# Patient Record
Sex: MALE
[De-identification: names, ages, dates, MRNs, and addresses within clinical notes are randomized per-mention and may not be internally consistent; named-entity substitution may affect disease eponyms.]

---

## 2020-12-14 ENCOUNTER — APPOINTMENT (OUTPATIENT)
Dept: HEART AND VASCULAR | Facility: CLINIC | Age: 55
End: 2020-12-14
Payer: COMMERCIAL

## 2020-12-14 VITALS — DIASTOLIC BLOOD PRESSURE: 80 MMHG | HEART RATE: 101 BPM | SYSTOLIC BLOOD PRESSURE: 120 MMHG

## 2020-12-14 PROCEDURE — 99204 OFFICE O/P NEW MOD 45 MIN: CPT

## 2020-12-14 PROCEDURE — 99072 ADDL SUPL MATRL&STAF TM PHE: CPT

## 2020-12-14 NOTE — PHYSICAL EXAM
[General Appearance - Well Developed] : well developed [Normal Appearance] : normal appearance [Well Groomed] : well groomed [General Appearance - Well Nourished] : well nourished [No Deformities] : no deformities [General Appearance - In No Acute Distress] : no acute distress [Normal Oral Mucosa] : normal oral mucosa [No Oral Pallor] : no oral pallor [No Oral Cyanosis] : no oral cyanosis [Normal Jugular Venous A Waves Present] : normal jugular venous A waves present [Normal Jugular Venous V Waves Present] : normal jugular venous V waves present [No Jugular Venous Fernandez A Waves] : no jugular venous fernandez A waves [Heart Rate And Rhythm] : heart rate and rhythm were normal [Heart Sounds] : normal S1 and S2 [Murmurs] : no murmurs present [Abdomen Soft] : soft [Abdomen Tenderness] : non-tender [Abdomen Mass (___ Cm)] : no abdominal mass palpated [Abnormal Walk] : normal gait [Gait - Sufficient For Exercise Testing] : the gait was sufficient for exercise testing [Nail Clubbing] : no clubbing of the fingernails [Cyanosis, Localized] : no localized cyanosis [Petechial Hemorrhages (___cm)] : no petechial hemorrhages [] : no ischemic changes

## 2020-12-18 NOTE — HISTORY OF PRESENT ILLNESS
[FreeTextEntry1] : 55-year-old man with past medical history of  hypoerlipidemia evaluation with his provider\par The patient is sent here by his primary M.D. for evaluation of shortness of breath.\par As per notes the patient has extensively diffuse bilateral peripheral ground glass opacities c/w COVID-19 which do not resolve after 1 month but negative swabs. \par The patient reports GUNN after walking 4 blocks\par Denies exertional angina, reports pleuritic chest pain\par Denies palpitations, syncope, presyncope, LE edema, orthopnea. \par \par PMD \par As above\par \par ALL\par NKDA\par \par MEDS\par rosuvastatin 10 mg daily \par \par SH \par no smoke, no etoh, no drugs\par \par \par Lipid profile 12/12/2020\par Cholesterol 228\par HDL 38\par Triglycerides 246\par \par creatinine 1.0\par \par EKG 12/14/2020\par SR, left axis deviation, non specific ST changes\par

## 2020-12-18 NOTE — ASSESSMENT
[FreeTextEntry1] : 55-year-old man with past medical history of hyperlipidemia here first evaluation with his provider for shortness of breath\par \par DYSPNEA ON EXERTION\par -CT chest reviewed and significant for extensive b/l peripheral ground glass opacities which are very suspicious for COVID-19\par -in the setting of above findings would recc obtaining PCR test for COVID-19 and pulmonary evaluation as it could be COVID -19 despite negative rapid test \par -if PCR test negative recc echo to r/o any WMA/cardiomyopathy\par -EKG without any acute ischemic changes\par \par HTN\par -well controlled on no meds\par \par HLD\par -cont with rosuvastatin 10 mg daily \par \par f/u in 1 week for echo results if PCR negative for  \par

## 2020-12-21 ENCOUNTER — APPOINTMENT (OUTPATIENT)
Dept: HEART AND VASCULAR | Facility: CLINIC | Age: 55
End: 2020-12-21
Payer: COMMERCIAL

## 2020-12-21 VITALS — DIASTOLIC BLOOD PRESSURE: 90 MMHG | HEART RATE: 92 BPM | SYSTOLIC BLOOD PRESSURE: 130 MMHG

## 2020-12-21 PROCEDURE — 99072 ADDL SUPL MATRL&STAF TM PHE: CPT

## 2020-12-21 PROCEDURE — 99214 OFFICE O/P EST MOD 30 MIN: CPT

## 2020-12-21 NOTE — HISTORY OF PRESENT ILLNESS
[FreeTextEntry1] : 55-year-old man with past medical history of  hyperlipidemia here for follow up and obtain echo results\par The patient was sent  here by his primary M.D. for evaluation of shortness of breath last week\par As per notes the patient has extensively diffuse bilateral peripheral ground glass opacities c/w COVID-19 which do not resolve after 1 month but negative swabs. \par The patient reports GUNN after walking 4 blocks which is not improving\par Denies exertional angina, reports pleuritic chest pain\par Denies palpitations, syncope, presyncope, LE edema, orthopnea. \par \par PMD \par As above\par \par ALL\par NKDA\par \par MEDS\par rosuvastatin 10 mg daily \par \par SH \par no smoke, no etoh, no drugs\par \par \par Lipid profile 12/12/2020\par Cholesterol 228\par HDL 38\par Triglycerides 246\par \par creatinine 1.0\par \par EKG 12/14/2020\par SR, left axis deviation, non specific ST changes\par \par Echocardiogram 12/15/2020\par Normal left ventricle size and function\par Normal right ventricle size and function\par Mildly dilated ascending aorta (4 cm)\par \par Carotids 12/15/2020\par No hemodynamically significant carotid stenosis\par

## 2020-12-21 NOTE — ASSESSMENT
[FreeTextEntry1] : 55-year-old man with past medical history of hyperlipidemia here for follow up\par \par DYSPNEA ON EXERTION\par -the patient reports improvement of his symptoms\par -CT chest reviewed and significant for extensive b/l peripheral ground glass opacities which are very suspicious for COVID-19\par -in the setting of above findings would recc obtaining PCR test for COVID-19 and pulmonary evaluation as it could be COVID -19 despite negative rapid test (the patient did not have repeat PCR yet)\par -echo wnl 12/15/2020\par -EKG without any acute ischemic changes\par \par HTN\par -well controlled on no meds\par \par HLD\par -cont with rosuvastatin 10 mg daily \par \par ABD AORTA ANEURYSMS\par -repeat echo in 6 months \par \par f/u in 6 months or sooner if any symptoms \par

## 2021-06-07 ENCOUNTER — APPOINTMENT (OUTPATIENT)
Dept: HEART AND VASCULAR | Facility: CLINIC | Age: 56
End: 2021-06-07
Payer: COMMERCIAL

## 2021-06-07 VITALS — SYSTOLIC BLOOD PRESSURE: 110 MMHG | HEART RATE: 75 BPM | DIASTOLIC BLOOD PRESSURE: 80 MMHG

## 2021-06-07 PROCEDURE — 99215 OFFICE O/P EST HI 40 MIN: CPT

## 2021-06-07 PROCEDURE — 99072 ADDL SUPL MATRL&STAF TM PHE: CPT

## 2021-06-07 NOTE — PHYSICAL EXAM
[General Appearance - Well Developed] : well developed [Normal Appearance] : normal appearance [Well Groomed] : well groomed [General Appearance - Well Nourished] : well nourished [No Deformities] : no deformities [General Appearance - In No Acute Distress] : no acute distress [Normal Oral Mucosa] : normal oral mucosa [No Oral Pallor] : no oral pallor [No Oral Cyanosis] : no oral cyanosis [Normal Jugular Venous A Waves Present] : normal jugular venous A waves present [Normal Jugular Venous V Waves Present] : normal jugular venous V waves present [No Jugular Venous Fernandez A Waves] : no jugular venous fernandez A waves [Heart Rate And Rhythm] : heart rate and rhythm were normal [Heart Sounds] : normal S1 and S2 [Murmurs] : no murmurs present [Abdomen Tenderness] : non-tender [Abdomen Soft] : soft [Abdomen Mass (___ Cm)] : no abdominal mass palpated [Abnormal Walk] : normal gait [Gait - Sufficient For Exercise Testing] : the gait was sufficient for exercise testing [Nail Clubbing] : no clubbing of the fingernails [Cyanosis, Localized] : no localized cyanosis [Petechial Hemorrhages (___cm)] : no petechial hemorrhages [] : no ischemic changes

## 2021-06-07 NOTE — HISTORY OF PRESENT ILLNESS
[FreeTextEntry1] : 55-year-old man with past medical history of  hyperlipidemia here for follow up \par The patient was sent  here by his primary M.D. for evaluation of shortness of breath which is persistent after  Severe Covid Pneumonia 3/2021 complicated by DVT LLE, discharged on supplemental O2and Eliquis. Pt also with questionable kidney or adrenal mass currently being follwed up by Oncologist  Dr Rocha\par The patient reports GUNN after walking 4 blocks,and  reports pleuritic chest pain on exertion\par Denies palpitations, syncope, presyncope, LE edema, orthopnea. \par \par PMD \par As above\par \par ALL\par NKDA\par \par MEDS\par rosuvastatin 10 mg daily \par eliquis 5 mg bid\par aspiinr 81 mg daily (stopped when started taking eliquis)\par \par SH \par no smoke, no etoh, no drugs\par \par \par Lipid profile 12/12/2020\par Cholesterol 228\par HDL 38\par Triglycerides 246\par \par creatinine 1.0\par \par EKG 12/14/2020\par SR, left axis deviation, non specific ST changes\par \par EKG 6/4/2021\par SR left axis deviation, non specific ST changes\par \par Echocardiogram 12/15/2020\par Normal left ventricle size and function\par Normal right ventricle size and function\par Mildly dilated ascending aorta (4 cm)\par \par Carotids 12/15/2020\par No hemodynamically significant carotid stenosis\par \par \par CT Chest 5/10/21\par 1.      Partial resolution of previously seen ground-glass opacities with new fibrotic changes, especially involving the bilateral upper lobes and anterior segment of right upper lobe.  \par 2.      New mediastinal lymphadenopathy.  Clinical correlation is recommended. \par \par LABS 5/7/2021\par  creat 1.1\par ldl 122\par trig 343 \par chol 222\par

## 2021-06-07 NOTE — ASSESSMENT
[FreeTextEntry1] : 55-year-old man with past medical history of hyperlipidemia here for follow up\par \par DYSPNEA ON EXERTION\par -In the setting of dyspnea on exertion, chest pain, abnormal EKG, will define coronary anatomy with coronary CT scan to r/o CAD -echo to r/o any wma\par \par \par HTN\par -well controlled on no meds\par \par HLD\par -cont with rosuvastatin 10 mg daily \par \par ABD AORTA ANEURYSMS\par -repeat echo today \par \par f/u in 2 weeks for echo and CT coronaries results

## 2021-06-15 DIAGNOSIS — R07.9 CHEST PAIN, UNSPECIFIED: ICD-10-CM

## 2021-08-09 ENCOUNTER — APPOINTMENT (OUTPATIENT)
Dept: HEART AND VASCULAR | Facility: CLINIC | Age: 56
End: 2021-08-09
Payer: COMMERCIAL

## 2021-08-09 VITALS — SYSTOLIC BLOOD PRESSURE: 110 MMHG | DIASTOLIC BLOOD PRESSURE: 78 MMHG | HEART RATE: 76 BPM

## 2021-08-09 DIAGNOSIS — I70.219 ATHEROSCLEROSIS OF NATIVE ARTERIES OF EXTREMITIES WITH INTERMITTENT CLAUDICATION, UNSPECIFIED EXTREMITY: ICD-10-CM

## 2021-08-09 PROCEDURE — 99215 OFFICE O/P EST HI 40 MIN: CPT

## 2021-08-09 NOTE — ASSESSMENT
[FreeTextEntry1] : 56-year-old man with past medical history of hyperlipidemia here for follow up\par \par DYSPNEA ON EXERTION\par -Reviewed at length with the patient the results of the CT scan.  Overall nonobstructive in nature coronary cardiac vessels.\par -In the setting of symptoms I recommended cardiac catheterization but the patient declined at this point.\par He wants to talk with his family and will decide in the next few weeks\par -Echocardiogram within normal limits\par -Recommend to continue with aspirin atorvastatin\par -Recommend to call the clinic immediately if onset of chest pain\par \par LE paresthesias\par -We will obtain RAHEEM/PVR\par -Duplex lower extremity bilaterally to rule out DVT\par -Continue to follow-up with primary MD- (the patient was told to stop anticoagulation and start aspirin, obtain records re: DVT and PE in the past , would recc heme work up) \par \par HTN\par -well controlled on no meds\par \par HLD\par -cont with rosuvastatin 20 mg daily \par Repeat fasting lipid panel \par \par ABD AORTA ANEURYSMS\par -repeat echo showed aortic root wnl\par \par f/u in 4 weeks for RAHEEM/PVR, duplex lower extremity and to assess symptoms

## 2021-08-09 NOTE — HISTORY OF PRESENT ILLNESS
[FreeTextEntry1] : 56-year-old man with past medical history of  hyperlipidemia here for follow up and obtain echo and CT results.\par The patient reports improvement of his dyspnea on exertion but not complete resolution.\par Denies chest pain.\par Reports improved exercise tolerance.\par He is undergoing severe stress at home, due to unemployment. \par Reports paresthesias bilaterally in his legs.\par \par As per previous note:\par The patient was sent  here by his primary M.D. for evaluation of shortness of breath which is persistent after  Severe Covid Pneumonia 3/2021 complicated by DVT LLE, discharged on supplemental O2and Eliquis. Pt also with questionable kidney or adrenal mass currently being follwed up by Oncologist  Dr Rocha\par The patient reports GUNN after walking 4 blocks,and  reports pleuritic chest pain on exertion\par Denies palpitations, syncope, presyncope, LE edema, orthopnea. \par \par PMD \par As above\par \par ALL\par NKDA\par \par MEDS\par rosuvastatin 20 mg daily \par eliquis 5 mg bid\par aspiinr 81 mg daily (stopped when started taking eliquis)\par \par SH \par no smoke, no etoh, no drugs\par \par \par Lipid profile 12/12/2020\par Cholesterol 228\par HDL 38\par Triglycerides 246\par \par creatinine 1.0\par \par EKG 12/14/2020\par SR, left axis deviation, non specific ST changes\par \par EKG 6/4/2021\par SR left axis deviation, non specific ST changes\par \par Echocardiogram 12/15/2020\par Normal left ventricle size and function\par Normal right ventricle size and function\par Mildly dilated ascending aorta (4 cm)\par \par Carotids 12/15/2020\par No hemodynamically significant carotid stenosis\par \par \par CT Chest 5/10/21\par 1.      Partial resolution of previously seen ground-glass opacities with new fibrotic changes, especially involving the bilateral upper lobes and anterior segment of right upper lobe.  \par 2.      New mediastinal lymphadenopathy.  Clinical correlation is recommended. \par \par LABS 5/7/2021\par  creat 1.1\par ldl 122\par trig 343 \par chol 222\par \par \par CT CORONARIES ([place]) 6/23/2021\par Left main: Widely patent and free of plaque\par LAD: Proximal LAD appears widely patent, distal LAD appears widely patent\par Septal branch measures 1.5 mm and contain noncalcified plaque all regions with minimal stenosis densely calcified plaque in the proximal vessel which obscures the lumen appears to cause moderate to high-grade stenosis\par Lcx: Widely patent\par RCA widely patent and free of plaque\par \par Echocardiogram 6/7/2021\par Normal left ventricular function\par Trivial aortic, mild mitral and mild TR\par Aortic root 3.4 cm\par EKG 8/9/2021\par Sinus rhythm, incomplete right bundle branch block

## 2021-09-13 ENCOUNTER — APPOINTMENT (OUTPATIENT)
Dept: HEART AND VASCULAR | Facility: CLINIC | Age: 56
End: 2021-09-13

## 2021-09-20 ENCOUNTER — APPOINTMENT (OUTPATIENT)
Dept: HEART AND VASCULAR | Facility: CLINIC | Age: 56
End: 2021-09-20

## 2021-12-20 ENCOUNTER — APPOINTMENT (OUTPATIENT)
Dept: HEART AND VASCULAR | Facility: CLINIC | Age: 56
End: 2021-12-20
Payer: COMMERCIAL

## 2021-12-20 VITALS — DIASTOLIC BLOOD PRESSURE: 76 MMHG | SYSTOLIC BLOOD PRESSURE: 122 MMHG | HEART RATE: 70 BPM

## 2021-12-20 PROCEDURE — 99214 OFFICE O/P EST MOD 30 MIN: CPT

## 2021-12-20 NOTE — HISTORY OF PRESENT ILLNESS
[FreeTextEntry1] : 56-year-old man with past medical history of  hyperlipidemia here for follow up.\par The patient denies any chest pain, shortness of breath, palpitations, syncope, presyncope.\par Still complaining of a bilateral leg paresthesias for which he is undergoing extensive work-up.\par Reports he has undergone RAHEEM PVR which resulted within normal limits (no documentation available).\par He also reports he is being referred to neurology and he will undergo an MRI of his spine\par reports that he is able to walk for 30 minutes/day\par  \par \par PMD \par As above\par \par ALL\par NKDA\par \par MEDS\par rosuvastatin 20 mg daily \par aspirin 81 mg daily\par gabapentin \par eliquis 5 mg bid (stopped)\par aspirin 81 mg daily (stopped when started taking eliquis)\par \par SH \par no smoke, no etoh, no drugs\par \par \par Lipid profile 12/12/2020\par Cholesterol 228\par HDL 38\par Triglycerides 246\par \par creatinine 1.0\par \par Labs 12/18/2021\par Cholesterol 147\par HDL 38\par Triglycerides 200\par LDL 69\par Creatinine 0.8\par AST ALT within normal limits\par \par EKG 12/14/2020\par SR, left axis deviation, non specific ST changes\par \par EKG 6/4/2021\par SR left axis deviation, non specific ST changes\par \par EKG 12/7/2021\par Sinus rhythm, left axis deviation, unchanged\par \par Echocardiogram 12/15/2020\par Normal left ventricle size and function\par Normal right ventricle size and function\par Mildly dilated ascending aorta (4 cm)\par \par Carotids 12/15/2020\par No hemodynamically significant carotid stenosis\par \par \par CT Chest 5/10/21\par 1.      Partial resolution of previously seen ground-glass opacities with new fibrotic changes, especially involving the bilateral upper lobes and anterior segment of right upper lobe.  \par 2.      New mediastinal lymphadenopathy.  Clinical correlation is recommended. \par \par LABS 5/7/2021\par  creat 1.1\par ldl 122\par trig 343 \par chol 222\par \par \par CT CORONARIES ([place]) 6/23/2021\par Left main: Widely patent and free of plaque\par LAD: Proximal LAD appears widely patent, distal LAD appears widely patent\par Septal branch measures 1.5 mm and contain noncalcified plaque all regions with minimal stenosis densely calcified plaque in the proximal vessel which obscures the lumen appears to cause moderate to high-grade stenosis\par Lcx: Widely patent\par RCA widely patent and free of plaque\par \par Echocardiogram 6/7/2021\par Normal left ventricular function\par Trivial aortic, mild mitral and mild TR\par Aortic root 3.4 cm\par EKG 8/9/2021\par Sinus rhythm, incomplete right bundle branch block

## 2021-12-20 NOTE — ASSESSMENT
[FreeTextEntry1] : 56-year-old man with past medical history of hyperlipidemia here for follow up\par \par DYSPNEA ON EXERTION\par -Resolved, the patient denies exertional angina with good exercise tolerance\par -As per previous note the patient declined cardiac catheterization in the setting of lack of symptoms and overall nonobstructive coronary cardiac vessels. (The patient was offered a cardiac catheterization but declined)\par -Echocardiogram within normal limits\par -Recommend to continue with aspirin atorvastatin\par -Recommend to call the clinic immediately if onset of chest pain, explained that we will proceed with cardiac catheterization\par \par LE paresthesias\par -Continue to follow-up with primary MD-(please obtain records of RAHEEM PVR)\par \par HTN\par -well controlled on no meds\par \par HLD\par -cont with rosuvastatin 20 mg daily \par \par ABD AORTA ANEURYSMS\par -repeat echo6/2021  showed aortic root wnl, \par \par f/u in 4 weeks or sooner if any symptoms

## 2022-08-17 ENCOUNTER — APPOINTMENT (OUTPATIENT)
Dept: HEART AND VASCULAR | Facility: CLINIC | Age: 57
End: 2022-08-17
Payer: COMMERCIAL

## 2022-08-17 PROCEDURE — XXXXX: CPT

## 2023-02-22 ENCOUNTER — APPOINTMENT (OUTPATIENT)
Dept: HEART AND VASCULAR | Facility: CLINIC | Age: 58
End: 2023-02-22
Payer: COMMERCIAL

## 2023-02-22 PROCEDURE — 93000 ELECTROCARDIOGRAM COMPLETE: CPT

## 2023-02-22 PROCEDURE — 99214 OFFICE O/P EST MOD 30 MIN: CPT

## 2023-02-28 NOTE — PHYSICAL EXAM
[Well Developed] : well developed [Well Nourished] : well nourished [No Acute Distress] : no acute distress [Normal Conjunctiva] : normal conjunctiva [Normal Venous Pressure] : normal venous pressure [No Carotid Bruit] : no carotid bruit [Normal S1, S2] : normal S1, S2 [No Murmur] : no murmur [Clear Lung Fields] : clear lung fields [Good Air Entry] : good air entry [No Respiratory Distress] : no respiratory distress  [Soft] : abdomen soft [Non Tender] : non-tender [Normal Bowel Sounds] : normal bowel sounds [Normal Gait] : normal gait [No Edema] : no edema [No Cyanosis] : no cyanosis [No Skin Lesions] : no skin lesions [Moves all extremities] : moves all extremities [No Focal Deficits] : no focal deficits [Normal Speech] : normal speech [Alert and Oriented] : alert and oriented [Normal memory] : normal memory

## 2023-02-28 NOTE — HISTORY OF PRESENT ILLNESS
[FreeTextEntry1] : 57-year-old man with past medical history of hyperlipidemia and known cad here to reestablish care.\par The patient denies any chest pain, shortness of breath, palpitations, syncope, presyncope. Pt reports that he is able to walk for 30 minutes/day before he gets pain from chronic paresthesia. \par \par MEDS\par rosuvastatin 20 mg daily \par aspirin 81 mg daily\par Famotidine 20mg daily

## 2023-02-28 NOTE — DISCUSSION/SUMMARY
[FreeTextEntry1] : 57M hx of HPL who presents to reestablish care with cardiology\par \par 56-year-old man with past medical history of hyperlipidemia, htn and cad here for follow up\par \par CAD: known septal artery disease; currently asymptomatic. Cont on asa, statin\par \par \par HPL: stable lipid panel recently. Cont on current statin therapy\par \par HTN: Stable BP. Currently off BP meds. On them previously.\par \par LE paresthesias\par -Continue to follow-up with primary MD-(please obtain records of RAHEEM PVR)\par \par HLD\par -cont with rosuvastatin 20 mg daily \par \par ABD AORTA ANEURYSMS\par -stable on recent echo. Repeat echo in 1-2 years.\par \par RTC 4-6 months\par